# Patient Record
Sex: MALE | Race: WHITE | HISPANIC OR LATINO
[De-identification: names, ages, dates, MRNs, and addresses within clinical notes are randomized per-mention and may not be internally consistent; named-entity substitution may affect disease eponyms.]

---

## 2018-07-16 ENCOUNTER — IMAGING SERVICES (OUTPATIENT)
Dept: OTHER | Age: 38
End: 2018-07-16

## 2018-07-16 ENCOUNTER — HOSPITAL (OUTPATIENT)
Dept: OTHER | Age: 38
End: 2018-07-16
Attending: EMERGENCY MEDICINE

## 2018-08-03 ENCOUNTER — HOSPITAL (OUTPATIENT)
Dept: OTHER | Age: 38
End: 2018-08-03
Attending: EMERGENCY MEDICINE

## 2019-04-04 ENCOUNTER — HOSPITAL (OUTPATIENT)
Dept: OTHER | Age: 39
End: 2019-04-04
Attending: EMERGENCY MEDICINE

## 2019-09-30 ENCOUNTER — HOSPITAL (OUTPATIENT)
Dept: OTHER | Age: 39
End: 2019-09-30

## 2019-09-30 LAB
ABO + RH BLD: NORMAL
ABO + RH BLD: NORMAL
ANALYZER ANC (IANC): NORMAL
BASOPHILS # BLD: 0 K/MCL (ref 0–0.3)
BASOPHILS NFR BLD: 0 %
BLD GP AB SCN SERPL QL: NEGATIVE
CREAT SERPL-MCNC: 0.89 MG/DL (ref 0.67–1.17)
CROSSMATCH EXPIRE: NORMAL
DIFFERENTIAL METHOD BLD: NORMAL
EOSINOPHIL # BLD: 0.1 K/MCL (ref 0.1–0.5)
EOSINOPHIL NFR BLD: 1 %
ERYTHROCYTE [DISTWIDTH] IN BLOOD: 12 % (ref 11–15)
ETHANOL SERPL-MCNC: NORMAL MG/DL
GLUCOSE BLDC GLUCOMTR-MCNC: 284 MG/DL (ref 70–99)
HCT VFR BLD CALC: 48.3 % (ref 39–51)
HGB BLD-MCNC: 16.4 G/DL (ref 13–17)
IMM GRANULOCYTES # BLD AUTO: 0.1 K/MCL (ref 0–0.2)
IMM GRANULOCYTES NFR BLD: 2 %
LYMPHOCYTES # BLD: 3.4 K/MCL (ref 1–4.8)
LYMPHOCYTES NFR BLD: 39 %
MCH RBC QN AUTO: 31.8 PG (ref 26–34)
MCHC RBC AUTO-ENTMCNC: 34 G/DL (ref 32–36.5)
MCV RBC AUTO: 93.8 FL (ref 78–100)
MONOCYTES # BLD: 0.6 K/MCL (ref 0.3–0.9)
MONOCYTES NFR BLD: 7 %
NEUTROPHILS # BLD: 4.4 K/MCL (ref 1.8–7.7)
NEUTROPHILS NFR BLD: 51 %
NEUTS SEG NFR BLD: NORMAL %
NRBC (NRBCRE): 0 /100 WBC
PLATELET # BLD: 163 K/MCL (ref 140–450)
RBC # BLD: 5.15 MIL/MCL (ref 4.5–5.9)
WBC # BLD: 8.6 K/MCL (ref 4.2–11)

## 2019-09-30 PROCEDURE — 99285 EMERGENCY DEPT VISIT HI MDM: CPT | Performed by: SURGERY
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
## 2019-10-09 ENCOUNTER — TELEPHONE (OUTPATIENT)
Dept: OTHER | Age: 39
End: 2019-10-09

## 2019-10-10 ENCOUNTER — OFFICE VISIT (OUTPATIENT)
Dept: OTHER | Age: 39
End: 2019-10-10
Attending: SURGERY

## 2019-10-10 DIAGNOSIS — Z65.4 VICTIM OF CRIME: Primary | ICD-10-CM

## 2019-10-10 PROCEDURE — 90791 PSYCH DIAGNOSTIC EVALUATION: CPT

## 2019-10-10 ASSESSMENT — COGNITIVE AND FUNCTIONAL STATUS - GENERAL
PERCEPTION: APPROPRIATE
AFFECT: FLAT
ATTITUDE: GUARDED
THOUGHT_PROCESS: CONCRETE
MOOD: ANXIOUS
ORIENTATION: X4
THOUGHT_CONTENT: PREOCCUPIED

## 2019-10-10 ASSESSMENT — LIFESTYLE VARIABLES
IN YOUR LIFETIME HAVE YOU EVER USED SEDATIVES OR SLEEPING PILLS: NO
IN YOUR LIFETIME HAVE YOU EVER USED AMPHETAMINE TYPE STIMULANTS: NO
IN THE PAST 12 MONTHS HOW OFTEN HAVE YOU USED AMPHETAMINE TYPE STIMULANTS: NEVER
IN THE PAST 12 MONTHS HOW OFTEN HAVE YOU USED COCAINE TYPE STIMULANTS: NEVER
IN YOUR LIFETIME HAVE YOU EVER USED CANNABIS: YES
IN THE PAST 12 MONTHS HOW OFTEN HAVE YOU USED HALLUCINOGENS: NEVER
IN THE PAST 12 MONTHS HOW OFTEN HAVE YOU USED OPIOIDS: NEVER
EVER_SMOKED: YES
IN THE PAST 12 MONTHS HOW OFTEN HAVE YOU USED CANNABIS: NEVER
TOBACCO_USE_LAST_TWELVE_MONTHS: WEEKLY
IN YOUR LIFETIME HAVE YOU EVER USED COCAINE TYPE STIMULANTS: YES
IN THE PAST 12 MONTHS HOW OFTEN HAVE YOU USED SEDATIVES OR SLEEPING PILLS: NEVER

## 2019-10-10 ASSESSMENT — PATIENT HEALTH QUESTIONNAIRE - PHQ9
1. LITTLE INTEREST OR PLEASURE IN DOING THINGS: NOT AT ALL
CLINICAL INTERPRETATION OF PHQ9 SCORE: MILD DEPRESSION
SUM OF ALL RESPONSES TO PHQ9 QUESTIONS 1 AND 2: 0
SUM OF ALL RESPONSES TO PHQ QUESTIONS 1-9: 7
6. FEELING BAD ABOUT YOURSELF - OR THAT YOU ARE A FAILURE OR HAVE LET YOURSELF OR YOUR FAMILY DOWN: NOT AT ALL
9. THOUGHTS THAT YOU WOULD BE BETTER OFF DEAD, OR OF HURTING YOURSELF: NOT AT ALL
7. TROUBLE CONCENTRATING ON THINGS, SUCH AS READING THE NEWSPAPER OR WATCHING TELEVISION: MORE THAN HALF THE DAYS
2. FEELING DOWN, DEPRESSED OR HOPELESS: NOT AT ALL
4. FEELING TIRED OR HAVING LITTLE ENERGY: MORE THAN HALF THE DAYS
3. TROUBLE FALLING OR STAYING ASLEEP OR SLEEPING TOO MUCH: NEARLY EVERY DAY
5. POOR APPETITE OR OVEREATING: NOT AT ALL
8. MOVING OR SPEAKING SO SLOWLY THAT OTHER PEOPLE COULD HAVE NOTICED. OR THE OPPOSITE, BEING SO FIGETY OR RESTLESS THAT YOU HAVE BEEN MOVING AROUND A LOT MORE THAN USUAL: NOT AT ALL
10. IF YOU CHECKED OFF ANY PROBLEMS, HOW DIFFICULT HAVE THESE PROBLEMS MADE IT FOR YOU TO DO YOUR WORK, TAKE CARE OF THINGS AT HOME, OR GET ALONG WITH OTHER PEOPLE: YES

## 2019-10-10 ASSESSMENT — PATIENT HEALTH QUESTIONNAIRE - GENERAL
DURING YOUR LIFETIME, HAVE YOU EVER HAD ANOTHER TIME OF 2 WEEKS OR MORE WHEN YOU FELT DEPRESSED OR UNINTERESTED IN MOST THINGS, AND HAD MOST OF THE PROBLEMS WE JUST TALKED ABOUT: NO
DO YOU EVER HAVE TIMES FOR AT LEAST 1 WEEK WHEN MOST OF THE DAY OR NEARLY EVERY DAY YOU ARE EXPERIENCING INCREASED GOAL-DIRECTED ACTIVITY OR ENERGY: NO
DO YOU EVER HAVE TIMES FOR AT LEAST 4 DAYS IN A ROW BUT LESS THAN 1 WEEK WHEN MOST OF THE DAY OR NEARLY EVERY DAY YOU ARE EXPERIENCING INCREASED GOAL-DIRECTED ACTIVITY OR ENERGY?: NO
DURING YOUR LIFETIME HAVE EVER YOU FELT DEPRESSED OR SAD MOST DAYS, EVEN IF YOU FELT OKAY SOMETIMES?: YES
WAS THERE A TIME OF AT LEAST 2 MONTHS WHERE YOU FELT OK (WITHOUT DEPRESSION OR LOSS OF INTEREST) BETWEEN NOW AND THE LAST TIME YOU FELT DEPRESSED?: YES
DO YOU EVER HAVE TIMES THAT FOR AT LEAST 4 DAYS IN A ROW BUT LESS THAN 1 WEEK YOU HAVE ABNORMALLY AND CONTINUOUSLY HIGH, BIG, OR IRRITABLE MOOD: NO
HAVE THE SYMPTOMS OF (NAME THE HYPOMANIC SYMPTOMS THAT WERE ENDORSED) BEEN PRESENT FOR AT LEAST 2 YEARS (AT LEAST 1 YEAR IN CHILDREN AND ADOLESCENTS): NO
DURING THE 2 YEAR PERIOD (1 YEAR FOR CHILDREN AND ADOLESCENTS) HAVE THE SYMPTOMS WE JUST DISCUSSED BEEN PRESENT AT LEAST HALF OF THE TIME?: NO
DO YOU EVER HAVE TIMES THAT FOR AT LEAST 1 WEEK YOU HAVE ABNORMALLY AND CONTINUOUSLY HIGH, BIG, OR IRRTABLE MOOD: NO
HAVE THE SYMPTOMS OF (NAME DEPRESSION SYMPTOMS THAT WERE ENDORSED) BEEN PRESENT FOR AT LEAST 2 YEARS (AT LEAST 1 YEAR IN CHILDREN AND ADOLESCENTS): NO
DURING THE 2 YEAR PERIOD (1 YEAR FOR CHILDREN AND ADOLESCENTS) HAVE THE SYMPTOMS WE JUST DISCUSSED EVER BEEN ABSENT FOR 2 MONTHS AT A TIME?: NO

## 2019-10-10 ASSESSMENT — PAIN SCALES - PAIN ENJOYMENT GENERAL ACTIVITY SCALE (PEG)
AVG_PAIN_PASTWEEK: 8
INTERFERED_GENERAL_ACTIVITY: 6
INTERFERED_ENJOYMENT_LIFE: 6
PEG_TOTALSCORE: 7

## 2019-10-11 ASSESSMENT — ANXIETY QUESTIONNAIRES
7. FEELING AFRAID AS IF SOMETHING AWFUL MIGHT HAPPEN: NOT AT ALL
IF YOU CHECKED OFF ANY PROBLEMS ON THIS QUESTIONNAIRE, HOW DIFFICULT HAVE THESE PROBLEMS MADE IT FOR YOU TO DO YOUR WORK, TAKE CARE OF THINGS AT HOME, OR GET ALONG WITH OTHER PEOPLE: SOMEWHAT DIFFICULT
3. WORRYING TOO MUCH ABOUT DIFFERENT THINGS: 0
7. FEELING AFRAID AS IF SOMETHING AWFUL MIGHT HAPPEN: 0
5. BEING SO RESTLESS THAT IT IS HARD TO SIT STILL: SEVERAL DAYS
1. FEELING NERVOUS, ANXIOUS, OR ON EDGE: 1
GAD7 TOTAL SCORE: 4
5. BEING SO RESTLESS THAT IT IS HARD TO SIT STILL: 1
4. TROUBLE RELAXING: 2
4. TROUBLE RELAXING: MORE THAN HALF THE DAYS
6. BECOMING EASILY ANNOYED OR IRRITABLE: NOT AT ALL
1. FEELING NERVOUS, ANXIOUS, OR ON EDGE: SEVERAL DAYS
3. WORRYING TOO MUCH ABOUT DIFFERENT THINGS: NOT AT ALL
6. BECOMING EASILY ANNOYED OR IRRITABLE: 0
2. NOT BEING ABLE TO STOP OR CONTROL WORRYING: 0
2. NOT BEING ABLE TO STOP OR CONTROL WORRYING: NOT AT ALL

## 2019-10-11 ASSESSMENT — SLEEP AND FATIGUE QUESTIONNAIRES
QUESTION5: 5
QUESTION3: 5
QUESTION2: 5
PROBLEM_WITH_SLEEP: 5 - VERY MUCH
GOT_ENOUGH_SLEEP: 3 - SOMETIMES
QUESTION9: 4
QUESTION1: 4
SLEEP_DISTUBANCE_SCORE: 38
SLEEP_QUALITY: 4 - POOR
QUESTION8: 4
QUESTION7: 3
QUESTION6: 4
SATISFIED_WITH_SLEEP: 5 - NOT AT ALL
SLEEP_WAS_REFRESHING: 5 - NOT AT ALL
QUESTION4: 4

## 2019-10-11 ASSESSMENT — PATIENT HEALTH QUESTIONNAIRE - PHQ9
SUM OF ALL RESPONSES TO PHQ9 QUESTIONS 1 AND 2: 0
SUM OF ALL RESPONSES TO PHQ9 QUESTIONS 1 TO 9: 7

## 2019-10-23 ENCOUNTER — TELEPHONE (OUTPATIENT)
Dept: OTHER | Age: 39
End: 2019-10-23

## 2019-10-23 ENCOUNTER — ADMINISTRATIVE DOCUMENTATION (OUTPATIENT)
Dept: OTHER | Age: 39
End: 2019-10-23

## 2019-11-22 ENCOUNTER — HOSPITAL (OUTPATIENT)
Dept: OTHER | Age: 39
End: 2019-11-22

## 2019-11-22 PROCEDURE — 99283 EMERGENCY DEPT VISIT LOW MDM: CPT | Performed by: NURSE PRACTITIONER

## 2019-11-22 PROCEDURE — 12001 RPR S/N/AX/GEN/TRNK 2.5CM/<: CPT | Performed by: NURSE PRACTITIONER

## 2020-09-10 ENCOUNTER — APPOINTMENT (OUTPATIENT)
Dept: CT IMAGING | Facility: HOSPITAL | Age: 40
End: 2020-09-10
Attending: EMERGENCY MEDICINE
Payer: MEDICAID

## 2020-09-10 ENCOUNTER — HOSPITAL ENCOUNTER (EMERGENCY)
Facility: HOSPITAL | Age: 40
Discharge: HOME | End: 2020-09-10
Attending: EMERGENCY MEDICINE
Payer: MEDICAID

## 2020-09-10 VITALS
OXYGEN SATURATION: 99 % | TEMPERATURE: 96 F | RESPIRATION RATE: 17 BRPM | DIASTOLIC BLOOD PRESSURE: 77 MMHG | WEIGHT: 195 LBS | HEIGHT: 67 IN | SYSTOLIC BLOOD PRESSURE: 132 MMHG | BODY MASS INDEX: 30.61 KG/M2 | HEART RATE: 86 BPM

## 2020-09-10 DIAGNOSIS — S01.01XA SCALP LACERATION, INITIAL ENCOUNTER: Primary | ICD-10-CM

## 2020-09-10 DIAGNOSIS — S09.8XXA BLUNT HEAD TRAUMA, INITIAL ENCOUNTER: ICD-10-CM

## 2020-09-10 PROCEDURE — 12004 RPR S/N/AX/GEN/TRK7.6-12.5CM: CPT

## 2020-09-10 PROCEDURE — 99284 EMERGENCY DEPT VISIT MOD MDM: CPT

## 2020-09-10 PROCEDURE — 70450 CT HEAD/BRAIN W/O DYE: CPT | Performed by: EMERGENCY MEDICINE

## 2020-09-10 RX ORDER — IBUPROFEN 600 MG/1
600 TABLET ORAL ONCE
Status: COMPLETED | OUTPATIENT
Start: 2020-09-10 | End: 2020-09-10

## 2020-09-10 NOTE — ED PROVIDER NOTES
Patient Seen in: [facility] Emergency Department      History   Patient presents with:  Laceration Abrasion  Trauma    Stated Complaint: Lac to back of head, no LOC     HPI    This is a 76-year-old male with past medical history diabetes who present nontender and nondistended. Normoactive bowel sounds. No rebound. No guarding. EXTREMITIES: Warm with brisk capillary refill. ED Course   Labs Reviewed - No data to display               MDM     Patient's tetanus is up-to-date.     Laceration was a

## 2020-09-10 NOTE — ED INITIAL ASSESSMENT (HPI)
Patient states a piece of machinery fell and hit his [place]. No LOC, not on blood thinners. Lac to top of head, bleeding controlled on arrival to ED.

## (undated) NOTE — LETTER
Date & Time: 9/10/2020, 3:14 PM  Patient: Jewel Medina  Encounter Provider(s):    Jennifer Ramos DO       To Whom It May Concern:    Jewel Medina was seen and treated in our department on 9/10/2020.  He should not return to work until Evaluated by [employer]